# Patient Record
Sex: FEMALE | Race: WHITE | Employment: OTHER | URBAN - METROPOLITAN AREA
[De-identification: names, ages, dates, MRNs, and addresses within clinical notes are randomized per-mention and may not be internally consistent; named-entity substitution may affect disease eponyms.]

---

## 2018-03-09 PROBLEM — M85.80 OSTEOPENIA: Status: ACTIVE | Noted: 2018-03-09

## 2021-03-24 PROBLEM — N94.89 ADNEXAL MASS: Status: ACTIVE | Noted: 2021-03-24

## 2021-03-26 PROBLEM — H02.839 DERMATOCHALASIS OF EYELID: Status: ACTIVE | Noted: 2017-05-16

## 2021-03-26 PROBLEM — I10 HTN (HYPERTENSION): Status: ACTIVE | Noted: 2019-06-25

## 2021-03-26 PROBLEM — H02.36 BLEPHAROCHALASIS OF BOTH EYES: Status: ACTIVE | Noted: 2019-08-27

## 2021-03-26 PROBLEM — H02.33 BLEPHAROCHALASIS OF BOTH EYES: Status: ACTIVE | Noted: 2019-08-27

## 2021-03-26 PROBLEM — Z96.1 PSEUDOPHAKIA OF BOTH EYES: Status: ACTIVE | Noted: 2019-06-26

## 2021-03-30 ENCOUNTER — OFFICE VISIT (OUTPATIENT)
Dept: ONCOLOGY | Age: 81
End: 2021-03-30
Payer: MEDICARE

## 2021-03-30 VITALS
WEIGHT: 182 LBS | DIASTOLIC BLOOD PRESSURE: 67 MMHG | SYSTOLIC BLOOD PRESSURE: 151 MMHG | OXYGEN SATURATION: 99 % | BODY MASS INDEX: 35.73 KG/M2 | HEIGHT: 60 IN | TEMPERATURE: 97.1 F | HEART RATE: 68 BPM

## 2021-03-30 DIAGNOSIS — Z01.818 PREOPERATIVE TESTING: Primary | ICD-10-CM

## 2021-03-30 DIAGNOSIS — N83.8 OVARIAN MASS, LEFT: ICD-10-CM

## 2021-03-30 PROCEDURE — G8754 DIAS BP LESS 90: HCPCS | Performed by: OBSTETRICS & GYNECOLOGY

## 2021-03-30 PROCEDURE — G8399 PT W/DXA RESULTS DOCUMENT: HCPCS | Performed by: OBSTETRICS & GYNECOLOGY

## 2021-03-30 PROCEDURE — 1090F PRES/ABSN URINE INCON ASSESS: CPT | Performed by: OBSTETRICS & GYNECOLOGY

## 2021-03-30 PROCEDURE — G8417 CALC BMI ABV UP PARAM F/U: HCPCS | Performed by: OBSTETRICS & GYNECOLOGY

## 2021-03-30 PROCEDURE — 99205 OFFICE O/P NEW HI 60 MIN: CPT | Performed by: OBSTETRICS & GYNECOLOGY

## 2021-03-30 PROCEDURE — G8510 SCR DEP NEG, NO PLAN REQD: HCPCS | Performed by: OBSTETRICS & GYNECOLOGY

## 2021-03-30 PROCEDURE — G8427 DOCREV CUR MEDS BY ELIG CLIN: HCPCS | Performed by: OBSTETRICS & GYNECOLOGY

## 2021-03-30 PROCEDURE — 1101F PT FALLS ASSESS-DOCD LE1/YR: CPT | Performed by: OBSTETRICS & GYNECOLOGY

## 2021-03-30 PROCEDURE — G8753 SYS BP > OR = 140: HCPCS | Performed by: OBSTETRICS & GYNECOLOGY

## 2021-03-30 PROCEDURE — G8536 NO DOC ELDER MAL SCRN: HCPCS | Performed by: OBSTETRICS & GYNECOLOGY

## 2021-03-30 NOTE — PROGRESS NOTES
Qamar Amaro is a 80 y.o. female presents in office for left adnexal mass. Pt reports tenderness on LLQ. Pt denies any vag bleeding or problems w/ voiding and bm. Chief Complaint   Patient presents with    New Patient     lt adnexal mass, hx of cervical cancer           Visit Vitals  BP (!) 151/67 (BP 1 Location: Left upper arm, BP Patient Position: Sitting)   Pulse 68   Temp 97.1 °F (36.2 °C) (Oral)   Ht 5' (1.524 m)   Wt 182 lb (82.6 kg)   SpO2 99%   BMI 35.54 kg/m²         1. Have you been to the ER, urgent care clinic since your last visit? Hospitalized since your last visit? No    2. Have you seen or consulted any other health care providers outside of the [address] [place] [address] since your last visit? Include any pap smears or colon screening. Yes Dr. Raz Velasquez    3 most recent [facility] 36 Screens 3/30/2021   Little interest or pleasure in doing things Not at all   Feeling down, depressed, irritable, or hopeless Not at all   Total Score PHQ 2 0       No flowsheet data found. Fall Risk Assessment, last 12 mths 3/30/2021   Able to walk? Yes   Fall in past 12 months? 0   Do you feel unsteady? 0   Are you worried about falling 0       No flowsheet data found. \

## 2021-03-30 NOTE — PROGRESS NOTES
[facility]  [address], [address], [address] [facility]  [address] [facility], [address]  [place], [address]   [phone]  Chacha Barrow DO      Patient ID:  Name:  Kimberly Samayoa  MRN:  [id number]  :  1940/81 y.o. Date:  3/30/2021      HISTORY OF PRESENT ILLNESS:  Kimberly Samayoa is a 80 y.o.  postmenopausal female referred by Dr. Breonna Ascencio for Left adnexal mass. Pt had been having abdominal pain and had a CT scan by her primary revealed mass. Was seen by dr. Breonna Ascencio and had TVUS as below. Pt continues to have pain in LLQ that has gotten slightly worse. Denies bleeding.        H/o cervical cancer s/p radical hysterectomy, bso with positive nodes s/p EBRT to pelvic and para-aortic regions in   Labs:  : 23.8      Imaging  Scanned in media  TVUS 3/24/2021  Uterus surgically absent  Left adnexa: 6.9 x 3.3 x 3.7 cm    CT scanned in media  6.2 x 3.6 x 8.0 cm lobulated cystic lesion in the adnexa on left  ROS:   As above      Patient Active Problem List    Diagnosis Date Noted    Adnexal mass 2021    Blepharochalasis of both eyes 2019    Pseudophakia of both eyes 2019    HTN (hypertension) 2019    Meniere's disease     Hypercholesterolemia     Thyroid disease     Osteopenia 2018    Dermatochalasis of eyelid 2017    Malignant neoplasm ([address] 1970     Past Medical History:   Diagnosis Date    Basal cell adenocarcinoma     Cancer ([address]     cervical    Hypercholesterolemia     Hypertension     Meniere's disease     Osteopenia     Radiation therapy complication      (spontaneous vaginal delivery) 1964    Thyroid cancer ([address]     Thyroid disease       Past Surgical History:   Procedure Laterality Date    HX CATARACT REMOVAL Left 2019    HX CHOLECYSTECTOMY  2018    HX GYN  1971    rad hyst, xrt    HX ORTHOPAEDIC  1998    R carpal Tunnel    HX PARTIAL THYROIDECTOMY Left 1986    HX SKIN BIOPSY  2018    basal cell removed    HX TONSIL AND ADENOIDECTOMY  1948      OB History        1    Para   1    Term                AB        Living   1       SAB        TAB        Ectopic        Molar        Multiple        Live Births   1              Social History     Tobacco Use    Smoking status: Former Smoker     Quit date:      Years since quittin.4    Smokeless tobacco: Never Used   Substance Use Topics    Alcohol use: No      Family History   Problem Relation Age of Onset    Cancer Mother         cervix    Thyroid Disease Mother     Heart Disease Father     Migraines Sister     Thyroid Disease Sister       Current Outpatient Medications   Medication Sig    dilTIAZem ER (DILACOR XR) 120 mg capsule Take  by mouth daily.  rizatriptan (Maxalt-MLT) 5 mg rapid dissolve tablet Take  by mouth.  cholecalciferol (Vitamin D3) (1000 Units /25 mcg) tablet Take  by mouth daily.  vitamin e (E GEMS) 100 unit capsule Take  by mouth daily.  Premarin 0.625 mg tablet TAKE 1 TABLET DAILY    levothyroxine (SYNTHROID) 137 mcg tablet      No current facility-administered medications for this visit. Allergies   Allergen Reactions    Nortriptyline Other (comments)    Codeine Nausea and Vomiting          OBJECTIVE:    Physical Exam  VITAL SIGNS: Visit Vitals  BP (!) 151/67 (BP 1 Location: Left upper arm, BP Patient Position: Sitting)   Pulse 68   Temp 97.1 °F (36.2 °C) (Oral)   Ht 5' (1.524 m)   Wt 82.6 kg (182 lb)   SpO2 99%   BMI 35.54 kg/m²      GENERAL ALAINA: in no apparent distress and well developed and well nourished   MUSCULOSKEL: no joint tenderness, deformity or swelling   INTEGUMENT:  warm and dry, no rashes or lesions   ABDOMEN . soft, NT, ND, No masses appreciated   EXTREMITIES: extremities normal, atraumatic, no cyanosis or edema   PELVIC: External genitalia: normal general appearance  Vaginal: atrophic mucosa  Cervix: removed surgically  Adnexa: non palpable due to vaginal scarring  Uterus: removed surgically   RECTAL: deferred   SUSANNA SURVEY: Cervical, supraclavicular, axillary and inguinal nodes normal.   NEURO: Grossly normal         IMPRESSION/PLAN:  1. Left adnexal mass, pelvic pain   --reviewed her imaging and bloodwork and explained likely benign, however, would recommend surgical evaluation especially since having pain   -discussed plan for resection of mass, bso with intraop path and possible staging although explained would likely defer lymph node dissection given prior XRT   -discussed robotic approach   Will need pcp clearance   -surgery to be scheduled at [facility]   -Risks, benefits and alternatives of surgery discussed in detail. Risks including bleeding, infection, blood clot, injury to nearby organs including bladder, bowel, ureters and blood vessels.            The total time spent was 60 minutes regarding this patients diagnosis of pelvic mass and >50% of this time was spent counseling and coordinating care    [facility]  3/30/39540:12 PM

## 2021-03-30 NOTE — LETTER
3/30/2021 Patient: Haritha Oh YOB: 1940 Date of Visit: 3/30/2021 Beverly Peters, [address] [place] [id number] Via Fax: [phone] Dear Beverly Peters MD, Thank you for referring Ms. Haritha Oh to [facility] for evaluation. My notes for this consultation are attached. If you have questions, please do not hesitate to call me. I look forward to following your patient along with you. Sincerely, Rossy Prajapati MD

## 2021-03-30 NOTE — H&P (VIEW-ONLY)
[address], [address] [place], [address] [place] 
[address] [place] [address], [address] [facility] 
 [phone] Tracie Weinberg DO Patient ID: 
Name:  Tina Macias MRN:  [id number] :  1940/81 y.o. Date:  3/30/2021 HISTORY OF PRESENT ILLNESS: 
Tina Macias is a 80 y.o.  postmenopausal female referred by Dr. Janet Sen for Left adnexal mass. Pt had been having abdominal pain and had a CT scan by her primary revealed mass. Was seen by dr. Janet Sen and had TVUS as below. Pt continues to have pain in LLQ that has gotten slightly worse. Denies bleeding. H/o cervical cancer s/p radical hysterectomy, bso with positive nodes s/p EBRT to pelvic and para-aortic regions in  Labs: 
: 23.8 Imaging Scanned in media TVUS 3/24/2021 Uterus surgically absent Left adnexa: 6.9 x 3.3 x 3.7 cm 
 
CT scanned in media 6.2 x 3.6 x 8.0 cm lobulated cystic lesion in the adnexa on left ROS:  
As above Patient Active Problem List  
 Diagnosis Date Noted  Adnexal mass 2021  Blepharochalasis of both eyes 2019  Pseudophakia of both eyes 2019  
 HTN (hypertension) 2019  Meniere's disease  Hypercholesterolemia  Thyroid disease  Osteopenia 2018  Dermatochalasis of eyelid 2017  Malignant neoplasm ([address] 1970 Past Medical History:  
Diagnosis Date  Basal cell adenocarcinoma  Cancer ([address] cervical  
 Hypercholesterolemia  Hypertension  Meniere's disease  Osteopenia  Radiation therapy complication   (spontaneous vaginal delivery) 1964  Thyroid cancer ([address]  Thyroid disease Past Surgical History:  
Procedure Laterality Date  HX CATARACT REMOVAL Left 2019  HX CHOLECYSTECTOMY  2018  HX GYN  1971  
 rad hyst, xrt [address] [place] R carpal Tunnel  HX PARTIAL THYROIDECTOMY Left   HX SKIN BIOPSY  2018  
 basal cell removed [address] [facility] OB History Denisse Rivera 1 Para 1 Term  AB Living  
1 SAB  
   
 TAB Ectopic Molar Multiple Live Births 1 Social History Tobacco Use  Smoking status: Former Smoker Quit date: 1970 Years since quittin.2  Smokeless tobacco: Never Used Substance Use Topics  Alcohol use: No  
  
Family History Problem Relation Age of Onset  Cancer Mother   
     cervix  Thyroid Disease Mother  Heart Disease Father  Migraines Sister  Thyroid Disease Sister Current Outpatient Medications Medication Sig  
 dilTIAZem ER (DILACOR XR) 120 mg capsule Take  by mouth daily.  rizatriptan (Maxalt-MLT) 5 mg rapid dissolve tablet Take  by mouth.  cholecalciferol (Vitamin D3) (1000 Units /25 mcg) tablet Take  by mouth daily.  vitamin e (E GEMS) 100 unit capsule Take  by mouth daily.  Premarin 0.625 mg tablet TAKE 1 TABLET DAILY  levothyroxine (SYNTHROID) 137 mcg tablet No current facility-administered medications for this visit. Allergies Allergen Reactions  Nortriptyline Other (comments)  Codeine Nausea and Vomiting OBJECTIVE: 
 
Physical Exam 
VITAL SIGNS: Visit Vitals BP (!) 151/67 (BP 1 Location: Left upper arm, BP Patient Position: Sitting) Pulse 68 Temp 97.1 °F (36.2 °C) (Oral) Ht 5' (1.524 m) Wt 82.6 kg (182 lb) SpO2 99% BMI 35.54 kg/m² GENERAL ALAINA: in no apparent distress and well developed and well nourished MUSCULOSKEL: no joint tenderness, deformity or swelling INTEGUMENT:  warm and dry, no rashes or lesions ABDOMEN . soft, NT, ND, No masses appreciated EXTREMITIES: extremities normal, atraumatic, no cyanosis or edema PELVIC: External genitalia: normal general appearance Vaginal: atrophic mucosa Cervix: removed surgically Adnexa: non palpable due to vaginal scarring Uterus: removed surgically RECTAL: deferred SUSANNA SURVEY: Cervical, supraclavicular, axillary and inguinal nodes normal.  
NEURO: Grossly normal  
 
 
 
IMPRESSION/PLAN: 
1. Left adnexal mass, pelvic pain 
 --reviewed her imaging and bloodwork and explained likely benign, however, would recommend surgical evaluation especially since having pain 
 -discussed plan for resection of mass, bso with intraop path and possible staging although explained would likely defer lymph node dissection given prior XRT 
 -discussed robotic approach Will need pcp clearance 
 -surgery to be scheduled at [facility] 
 -Risks, benefits and alternatives of surgery discussed in detail. Risks including bleeding, infection, blood clot, injury to nearby organs including bladder, bowel, ureters and blood vessels. The total time spent was 60 minutes regarding this patients diagnosis of pelvic mass and >50% of this time was spent counseling and coordinating care Lisa Singh DO Gynecologic Oncology 3/30/86741:12 PM

## 2021-04-05 ENCOUNTER — TELEPHONE (OUTPATIENT)
Dept: ONCOLOGY | Age: 81
End: 2021-04-05

## 2021-04-05 NOTE — TELEPHONE ENCOUNTER
Patient would like a call back regarding medical equipment after surgery. Patient wanted to know prior to surgery if this request could be started.

## 2021-04-06 DIAGNOSIS — Z01.818 PREOPERATIVE TESTING: ICD-10-CM

## 2021-04-06 NOTE — TELEPHONE ENCOUNTER
Spoke with patient who inquired if she would need a walker or equipment to help with getting off the toilet. Relayed that the equipment is not normally needed after laparoscopic procedures.

## 2021-04-08 ENCOUNTER — HOSPITAL ENCOUNTER (OUTPATIENT)
Dept: PREADMISSION TESTING | Age: 81
Discharge: HOME | End: 2021-04-08
Payer: MEDICARE

## 2021-04-08 ENCOUNTER — TRANSCRIBE ORDER (OUTPATIENT)
Dept: REGISTRATION | Age: 81
End: 2021-04-08

## 2021-04-08 ENCOUNTER — OFFICE VISIT (OUTPATIENT)
Dept: ONCOLOGY | Age: 81
End: 2021-04-08

## 2021-04-08 VITALS
SYSTOLIC BLOOD PRESSURE: 186 MMHG | HEIGHT: 60 IN | DIASTOLIC BLOOD PRESSURE: 92 MMHG | BODY MASS INDEX: 34.16 KG/M2 | HEART RATE: 72 BPM | RESPIRATION RATE: 16 BRPM | WEIGHT: 174 LBS

## 2021-04-08 DIAGNOSIS — N83.8 BROAD LIGAMENT LACERATION SYNDROME: Primary | ICD-10-CM

## 2021-04-08 DIAGNOSIS — Z01.818 PREOPERATIVE TESTING: ICD-10-CM

## 2021-04-08 DIAGNOSIS — N83.8 BROAD LIGAMENT LACERATION SYNDROME: ICD-10-CM

## 2021-04-08 DIAGNOSIS — Z71.89 SURGICAL COUNSELING VISIT: Primary | ICD-10-CM

## 2021-04-08 LAB
ABO + RH BLD: NORMAL
ALBUMIN SERPL-MCNC: 4 G/DL (ref 3.4–5)
ALBUMIN/GLOB SERPL: 1.1 {RATIO} (ref 0.8–1.7)
ALP SERPL-CCNC: 78 U/L (ref 45–117)
ALT SERPL-CCNC: 24 U/L (ref 13–56)
ANION GAP SERPL CALC-SCNC: 6 MMOL/L (ref 3–18)
AST SERPL-CCNC: 19 U/L (ref 10–38)
ATRIAL RATE: 68 BPM
BASOPHILS # BLD: 0 K/UL (ref 0–0.1)
BASOPHILS NFR BLD: 1 % (ref 0–2)
BILIRUB SERPL-MCNC: 1 MG/DL (ref 0.2–1)
BLOOD GROUP ANTIBODIES SERPL: NORMAL
BUN SERPL-MCNC: 17 MG/DL (ref 7–18)
BUN/CREAT SERPL: 18 (ref 12–20)
CALCIUM SERPL-MCNC: 9.7 MG/DL (ref 8.5–10.1)
CALCULATED P AXIS, ECG09: 68 DEGREES
CALCULATED R AXIS, ECG10: 72 DEGREES
CALCULATED T AXIS, ECG11: 54 DEGREES
CHLORIDE SERPL-SCNC: 100 MMOL/L (ref 100–111)
CO2 SERPL-SCNC: 33 MMOL/L (ref 21–32)
CREAT SERPL-MCNC: 0.93 MG/DL (ref 0.6–1.3)
DIAGNOSIS, 93000: NORMAL
DIFFERENTIAL METHOD BLD: ABNORMAL
EOSINOPHIL # BLD: 0.1 K/UL (ref 0–0.4)
EOSINOPHIL NFR BLD: 2 % (ref 0–5)
ERYTHROCYTE [DISTWIDTH] IN BLOOD BY AUTOMATED COUNT: 13.7 % (ref 11.6–14.5)
GLOBULIN SER CALC-MCNC: 3.5 G/DL (ref 2–4)
GLUCOSE SERPL-MCNC: 96 MG/DL (ref 74–99)
HCT VFR BLD AUTO: 47.8 % (ref 35–45)
HGB BLD-MCNC: 15.6 G/DL (ref 12–16)
LYMPHOCYTES # BLD: 1.5 K/UL (ref 0.9–3.6)
LYMPHOCYTES NFR BLD: 21 % (ref 21–52)
MCH RBC QN AUTO: 30.1 PG (ref 24–34)
MCHC RBC AUTO-ENTMCNC: 32.6 G/DL (ref 31–37)
MCV RBC AUTO: 92.3 FL (ref 74–97)
MONOCYTES # BLD: 0.6 K/UL (ref 0.05–1.2)
MONOCYTES NFR BLD: 8 % (ref 3–10)
NEUTS SEG # BLD: 5 K/UL (ref 1.8–8)
NEUTS SEG NFR BLD: 69 % (ref 40–73)
P-R INTERVAL, ECG05: 188 MS
PLATELET # BLD AUTO: 254 K/UL (ref 135–420)
PMV BLD AUTO: 9.9 FL (ref 9.2–11.8)
POTASSIUM SERPL-SCNC: 3.6 MMOL/L (ref 3.5–5.5)
PROT SERPL-MCNC: 7.5 G/DL (ref 6.4–8.2)
Q-T INTERVAL, ECG07: 392 MS
QRS DURATION, ECG06: 80 MS
QTC CALCULATION (BEZET), ECG08: 416 MS
RBC # BLD AUTO: 5.18 M/UL (ref 4.2–5.3)
SODIUM SERPL-SCNC: 139 MMOL/L (ref 136–145)
SPECIMEN EXP DATE BLD: NORMAL
VENTRICULAR RATE, ECG03: 68 BPM
WBC # BLD AUTO: 7.3 K/UL (ref 4.6–13.2)

## 2021-04-08 PROCEDURE — 86901 BLOOD TYPING SEROLOGIC RH(D): CPT

## 2021-04-08 PROCEDURE — 93005 ELECTROCARDIOGRAM TRACING: CPT

## 2021-04-08 PROCEDURE — 85025 COMPLETE CBC W/AUTO DIFF WBC: CPT

## 2021-04-08 PROCEDURE — U0003 INFECTIOUS AGENT DETECTION BY NUCLEIC ACID (DNA OR RNA); SEVERE ACUTE RESPIRATORY SYNDROME CORONAVIRUS 2 (SARS-COV-2) (CORONAVIRUS DISEASE [COVID-19]), AMPLIFIED PROBE TECHNIQUE, MAKING USE OF HIGH THROUGHPUT TECHNOLOGIES AS DESCRIBED BY CMS-2020-01-R: HCPCS

## 2021-04-08 PROCEDURE — 80053 COMPREHEN METABOLIC PANEL: CPT

## 2021-04-08 PROCEDURE — 36415 COLL VENOUS BLD VENIPUNCTURE: CPT

## 2021-04-08 NOTE — PROGRESS NOTES
Reviewed consent form for robotic assisted laparoscopic resection of pelvic mass, bilateral salpingo-oophorectomy, possible staging and information packet for a bilateral salpingo-oophorectomy scheduled on 4/15/21 at 11 am with an arrival time of 9 am. Patient was given information packet that included pre-op and post-op instructions as well as the scheduled date, start time, arrival time, and length of the surgery and signed the consent form. Patient expressed understanding and had no further questions. Patient was encouraged to call if they have questions later. The patient was counseled at length about the risks of becky Covid-19 during their perioperative period and any recovery window from their procedure. The patient was made aware that becky Covid-19  may worsen their prognosis for recovering from their procedure and lend to a higher morbidity and/or mortality risk. All material risks, benefits, and reasonable alternatives including postponing the procedure were discussed. The patient does wish to proceed with the procedure at this time.

## 2021-04-09 ENCOUNTER — TELEPHONE (OUTPATIENT)
Dept: ONCOLOGY | Age: 81
End: 2021-04-09

## 2021-04-09 LAB — SARS-COV-2, COV2NT: NOT DETECTED

## 2021-04-09 NOTE — TELEPHONE ENCOUNTER
Pt called wanting to inform you that her BP has been high lately. Pt did speak to her PCP this morning. Surgery is scheduled for 4/15/21. Pt was instructed to start taking her Triamterene 1 tab in the am and 2 tabs of the Amolidipine in the pm. Pt to collect her BP readings over the weekend. Pt will f/u her PCP and will get back to us after.

## 2021-04-13 NOTE — TELEPHONE ENCOUNTER
Spoke with patient regarding blood pressure with change of medication. Pt reports blood pressure is improved.

## 2021-04-14 ENCOUNTER — TELEPHONE (OUTPATIENT)
Dept: ONCOLOGY | Age: 81
End: 2021-04-14

## 2021-04-15 ENCOUNTER — HOSPITAL ENCOUNTER (OUTPATIENT)
Age: 81
Setting detail: OUTPATIENT SURGERY
Discharge: HOME | End: 2021-04-15
Attending: OBSTETRICS & GYNECOLOGY | Admitting: OBSTETRICS & GYNECOLOGY
Payer: MEDICARE

## 2021-04-15 ENCOUNTER — ANESTHESIA EVENT (OUTPATIENT)
Dept: SURGERY | Age: 81
End: 2021-04-15
Payer: MEDICARE

## 2021-04-15 ENCOUNTER — TELEPHONE (OUTPATIENT)
Dept: ONCOLOGY | Age: 81
End: 2021-04-15

## 2021-04-15 ENCOUNTER — ANESTHESIA (OUTPATIENT)
Dept: SURGERY | Age: 81
End: 2021-04-15
Payer: MEDICARE

## 2021-04-15 VITALS
WEIGHT: 174.44 LBS | TEMPERATURE: 98.1 F | DIASTOLIC BLOOD PRESSURE: 67 MMHG | BODY MASS INDEX: 34.25 KG/M2 | OXYGEN SATURATION: 96 % | HEART RATE: 80 BPM | HEIGHT: 60 IN | RESPIRATION RATE: 12 BRPM | SYSTOLIC BLOOD PRESSURE: 123 MMHG

## 2021-04-15 DIAGNOSIS — N83.202 LEFT OVARIAN CYST: ICD-10-CM

## 2021-04-15 DIAGNOSIS — G89.18 POSTOPERATIVE PAIN: Primary | ICD-10-CM

## 2021-04-15 PROCEDURE — 58662 LAPAROSCOPY EXCISE LESIONS: CPT | Performed by: OBSTETRICS & GYNECOLOGY

## 2021-04-15 PROCEDURE — 77030008574 HC TBNG SUC IRR STRY -B: Performed by: OBSTETRICS & GYNECOLOGY

## 2021-04-15 PROCEDURE — 77030028402 HC SYS LAPSC TISS RETRV AMR -B: Performed by: OBSTETRICS & GYNECOLOGY

## 2021-04-15 PROCEDURE — 77030040830 HC CATH URETH FOL MDII -A: Performed by: OBSTETRICS & GYNECOLOGY

## 2021-04-15 PROCEDURE — 74011250636 HC RX REV CODE- 250/636: Performed by: ANESTHESIOLOGY

## 2021-04-15 PROCEDURE — C9290 INJ, BUPIVACAINE LIPOSOME: HCPCS | Performed by: OBSTETRICS & GYNECOLOGY

## 2021-04-15 PROCEDURE — 74011000250 HC RX REV CODE- 250: Performed by: ANESTHESIOLOGY

## 2021-04-15 PROCEDURE — 77030032060 HC PWDR HEMSTAT ARISTA ASRB 3GM BARD -C: Performed by: OBSTETRICS & GYNECOLOGY

## 2021-04-15 PROCEDURE — 77030020703 HC SEAL CANN DISP INTU -B: Performed by: OBSTETRICS & GYNECOLOGY

## 2021-04-15 PROCEDURE — 77030040361 HC SLV COMPR DVT MDII -B: Performed by: OBSTETRICS & GYNECOLOGY

## 2021-04-15 PROCEDURE — 74011250636 HC RX REV CODE- 250/636: Performed by: OBSTETRICS & GYNECOLOGY

## 2021-04-15 PROCEDURE — 76060000033 HC ANESTHESIA 1 TO 1.5 HR: Performed by: OBSTETRICS & GYNECOLOGY

## 2021-04-15 PROCEDURE — 58661 LAPAROSCOPY REMOVE ADNEXA: CPT | Performed by: OBSTETRICS & GYNECOLOGY

## 2021-04-15 PROCEDURE — 77030013079 HC BLNKT BAIR HGGR 3M -A: Performed by: ANESTHESIOLOGY

## 2021-04-15 PROCEDURE — 77030019927 HC TBNG IRR CYSTO BAXT -A: Performed by: OBSTETRICS & GYNECOLOGY

## 2021-04-15 PROCEDURE — 74011000250 HC RX REV CODE- 250: Performed by: OBSTETRICS & GYNECOLOGY

## 2021-04-15 PROCEDURE — 76210000000 HC OR PH I REC 2 TO 2.5 HR: Performed by: OBSTETRICS & GYNECOLOGY

## 2021-04-15 PROCEDURE — 77030025805 HC MANIP UTER RUMI COOP -B: Performed by: OBSTETRICS & GYNECOLOGY

## 2021-04-15 PROCEDURE — 77030020782 HC GWN BAIR PAWS FLX 3M -B: Performed by: OBSTETRICS & GYNECOLOGY

## 2021-04-15 PROCEDURE — 77030027743 HC APPL F/HEMSTAT BARD -B: Performed by: OBSTETRICS & GYNECOLOGY

## 2021-04-15 PROCEDURE — 77030008683 HC TU ET CUF COVD -A: Performed by: ANESTHESIOLOGY

## 2021-04-15 PROCEDURE — 74011250637 HC RX REV CODE- 250/637: Performed by: ANESTHESIOLOGY

## 2021-04-15 PROCEDURE — 77030016151 HC PROTCTR LNS DFOG COVD -B: Performed by: OBSTETRICS & GYNECOLOGY

## 2021-04-15 PROCEDURE — 77030002933 HC SUT MCRYL J&J -A: Performed by: OBSTETRICS & GYNECOLOGY

## 2021-04-15 PROCEDURE — 77030035277 HC OBTRTR BLDELSS DISP INTU -B: Performed by: OBSTETRICS & GYNECOLOGY

## 2021-04-15 PROCEDURE — 88112 CYTOPATH CELL ENHANCE TECH: CPT

## 2021-04-15 PROCEDURE — 2709999900 HC NON-CHARGEABLE SUPPLY: Performed by: OBSTETRICS & GYNECOLOGY

## 2021-04-15 PROCEDURE — 77030006643: Performed by: ANESTHESIOLOGY

## 2021-04-15 PROCEDURE — 88331 PATH CONSLTJ SURG 1 BLK 1SPC: CPT

## 2021-04-15 PROCEDURE — 77030020407 HC IV BLD WRMR ST 3M -A: Performed by: ANESTHESIOLOGY

## 2021-04-15 PROCEDURE — 77030002966 HC SUT PDS J&J -A: Performed by: OBSTETRICS & GYNECOLOGY

## 2021-04-15 PROCEDURE — 76010000934 HC OR TIME 1 TO 1.5HR INTENSV - TIER 2: Performed by: OBSTETRICS & GYNECOLOGY

## 2021-04-15 PROCEDURE — 74011000254 HC RX REV CODE- 254: Performed by: ANESTHESIOLOGY

## 2021-04-15 PROCEDURE — 74011000254 HC RX REV CODE- 254: Performed by: OBSTETRICS & GYNECOLOGY

## 2021-04-15 PROCEDURE — 88305 TISSUE EXAM BY PATHOLOGIST: CPT

## 2021-04-15 PROCEDURE — 76210000022 HC REC RM PH II 1.5 TO 2 HR: Performed by: OBSTETRICS & GYNECOLOGY

## 2021-04-15 PROCEDURE — 77030037241 HC PRT ACC BLDLSS AIRSEAL CNMD -B: Performed by: OBSTETRICS & GYNECOLOGY

## 2021-04-15 RX ORDER — ROCURONIUM BROMIDE 10 MG/ML
INJECTION, SOLUTION INTRAVENOUS AS NEEDED
Status: DISCONTINUED | OUTPATIENT
Start: 2021-04-15 | End: 2021-04-15 | Stop reason: HOSPADM

## 2021-04-15 RX ORDER — SODIUM CHLORIDE, SODIUM LACTATE, POTASSIUM CHLORIDE, CALCIUM CHLORIDE 600; 310; 30; 20 MG/100ML; MG/100ML; MG/100ML; MG/100ML
75 INJECTION, SOLUTION INTRAVENOUS CONTINUOUS
Status: DISCONTINUED | OUTPATIENT
Start: 2021-04-15 | End: 2021-04-15 | Stop reason: HOSPADM

## 2021-04-15 RX ORDER — FENTANYL CITRATE 50 UG/ML
INJECTION, SOLUTION INTRAMUSCULAR; INTRAVENOUS AS NEEDED
Status: DISCONTINUED | OUTPATIENT
Start: 2021-04-15 | End: 2021-04-15

## 2021-04-15 RX ORDER — HEPARIN SODIUM 5000 [USP'U]/ML
5000 INJECTION, SOLUTION INTRAVENOUS; SUBCUTANEOUS ONCE
Status: COMPLETED | OUTPATIENT
Start: 2021-04-15 | End: 2021-04-15

## 2021-04-15 RX ORDER — ACETAMINOPHEN 500 MG
1000 TABLET ORAL ONCE
Status: COMPLETED | OUTPATIENT
Start: 2021-04-15 | End: 2021-04-15

## 2021-04-15 RX ORDER — PROPOFOL 10 MG/ML
INJECTION, EMULSION INTRAVENOUS AS NEEDED
Status: DISCONTINUED | OUTPATIENT
Start: 2021-04-15 | End: 2021-04-15 | Stop reason: HOSPADM

## 2021-04-15 RX ORDER — ONDANSETRON 2 MG/ML
INJECTION INTRAMUSCULAR; INTRAVENOUS AS NEEDED
Status: DISCONTINUED | OUTPATIENT
Start: 2021-04-15 | End: 2021-04-15 | Stop reason: HOSPADM

## 2021-04-15 RX ORDER — SODIUM CHLORIDE, SODIUM LACTATE, POTASSIUM CHLORIDE, CALCIUM CHLORIDE 600; 310; 30; 20 MG/100ML; MG/100ML; MG/100ML; MG/100ML
125 INJECTION, SOLUTION INTRAVENOUS CONTINUOUS
Status: DISCONTINUED | OUTPATIENT
Start: 2021-04-15 | End: 2021-04-15 | Stop reason: HOSPADM

## 2021-04-15 RX ORDER — ONDANSETRON 4 MG/1
4 TABLET, FILM COATED ORAL
Qty: 30 TAB | Refills: 0 | Status: SHIPPED | OUTPATIENT
Start: 2021-04-15

## 2021-04-15 RX ORDER — ONDANSETRON 2 MG/ML
4 INJECTION INTRAMUSCULAR; INTRAVENOUS ONCE
Status: COMPLETED | OUTPATIENT
Start: 2021-04-15 | End: 2021-04-15

## 2021-04-15 RX ORDER — LABETALOL HCL 20 MG/4 ML
SYRINGE (ML) INTRAVENOUS AS NEEDED
Status: DISCONTINUED | OUTPATIENT
Start: 2021-04-15 | End: 2021-04-15 | Stop reason: HOSPADM

## 2021-04-15 RX ORDER — OXYCODONE AND ACETAMINOPHEN 5; 325 MG/1; MG/1
1 TABLET ORAL
Qty: 40 TAB | Refills: 0 | Status: SHIPPED | OUTPATIENT
Start: 2021-04-15 | End: 2021-04-29

## 2021-04-15 RX ORDER — SUCCINYLCHOLINE CHLORIDE 100 MG/5ML
SYRINGE (ML) INTRAVENOUS AS NEEDED
Status: DISCONTINUED | OUTPATIENT
Start: 2021-04-15 | End: 2021-04-15 | Stop reason: HOSPADM

## 2021-04-15 RX ORDER — LIDOCAINE HYDROCHLORIDE 20 MG/ML
INJECTION, SOLUTION EPIDURAL; INFILTRATION; INTRACAUDAL; PERINEURAL AS NEEDED
Status: DISCONTINUED | OUTPATIENT
Start: 2021-04-15 | End: 2021-04-15 | Stop reason: HOSPADM

## 2021-04-15 RX ORDER — CEFAZOLIN SODIUM/WATER 2 G/20 ML
2 SYRINGE (ML) INTRAVENOUS ONCE
Status: COMPLETED | OUTPATIENT
Start: 2021-04-15 | End: 2021-04-15

## 2021-04-15 RX ORDER — MIDAZOLAM HYDROCHLORIDE 1 MG/ML
INJECTION, SOLUTION INTRAMUSCULAR; INTRAVENOUS AS NEEDED
Status: DISCONTINUED | OUTPATIENT
Start: 2021-04-15 | End: 2021-04-15 | Stop reason: HOSPADM

## 2021-04-15 RX ORDER — SODIUM CHLORIDE, SODIUM LACTATE, POTASSIUM CHLORIDE, CALCIUM CHLORIDE 600; 310; 30; 20 MG/100ML; MG/100ML; MG/100ML; MG/100ML
25 INJECTION, SOLUTION INTRAVENOUS CONTINUOUS
Status: DISCONTINUED | OUTPATIENT
Start: 2021-04-15 | End: 2021-04-15 | Stop reason: HOSPADM

## 2021-04-15 RX ORDER — FENTANYL CITRATE 50 UG/ML
25 INJECTION, SOLUTION INTRAMUSCULAR; INTRAVENOUS AS NEEDED
Status: DISCONTINUED | OUTPATIENT
Start: 2021-04-15 | End: 2021-04-15 | Stop reason: HOSPADM

## 2021-04-15 RX ORDER — EPHEDRINE SULFATE/0.9% NACL/PF 50 MG/5 ML
SYRINGE (ML) INTRAVENOUS AS NEEDED
Status: DISCONTINUED | OUTPATIENT
Start: 2021-04-15 | End: 2021-04-15 | Stop reason: HOSPADM

## 2021-04-15 RX ORDER — HYDROMORPHONE HYDROCHLORIDE 1 MG/ML
0.2 INJECTION, SOLUTION INTRAMUSCULAR; INTRAVENOUS; SUBCUTANEOUS
Status: DISCONTINUED | OUTPATIENT
Start: 2021-04-15 | End: 2021-04-15 | Stop reason: HOSPADM

## 2021-04-15 RX ORDER — HYDRALAZINE HYDROCHLORIDE 20 MG/ML
INJECTION INTRAMUSCULAR; INTRAVENOUS AS NEEDED
Status: DISCONTINUED | OUTPATIENT
Start: 2021-04-15 | End: 2021-04-15 | Stop reason: HOSPADM

## 2021-04-15 RX ADMIN — FENTANYL CITRATE 50 MCG: 50 INJECTION, SOLUTION INTRAMUSCULAR; INTRAVENOUS at 11:48

## 2021-04-15 RX ADMIN — SUGAMMADEX 158 MG: 100 INJECTION, SOLUTION INTRAVENOUS at 12:56

## 2021-04-15 RX ADMIN — PROPOFOL 120 MG: 10 INJECTION, EMULSION INTRAVENOUS at 11:48

## 2021-04-15 RX ADMIN — ROCURONIUM BROMIDE 10 MG: 10 INJECTION, SOLUTION INTRAVENOUS at 11:48

## 2021-04-15 RX ADMIN — FENTANYL CITRATE 25 MCG: 50 INJECTION, SOLUTION INTRAMUSCULAR; INTRAVENOUS at 14:02

## 2021-04-15 RX ADMIN — MIDAZOLAM 2 MG: 1 INJECTION INTRAMUSCULAR; INTRAVENOUS at 11:42

## 2021-04-15 RX ADMIN — LABETALOL 20 MG/4 ML (5 MG/ML) INTRAVENOUS SYRINGE 5 MG: at 12:09

## 2021-04-15 RX ADMIN — CEFAZOLIN 2 G: 1 INJECTION, POWDER, FOR SOLUTION INTRAVENOUS at 11:54

## 2021-04-15 RX ADMIN — FENTANYL CITRATE 50 MCG: 50 INJECTION, SOLUTION INTRAMUSCULAR; INTRAVENOUS at 12:07

## 2021-04-15 RX ADMIN — SODIUM CHLORIDE, SODIUM LACTATE, POTASSIUM CHLORIDE, AND CALCIUM CHLORIDE: 600; 310; 30; 20 INJECTION, SOLUTION INTRAVENOUS at 12:35

## 2021-04-15 RX ADMIN — SODIUM CHLORIDE, SODIUM LACTATE, POTASSIUM CHLORIDE, AND CALCIUM CHLORIDE: 600; 310; 30; 20 INJECTION, SOLUTION INTRAVENOUS at 11:42

## 2021-04-15 RX ADMIN — FENTANYL CITRATE 25 MCG: 50 INJECTION, SOLUTION INTRAMUSCULAR; INTRAVENOUS at 13:52

## 2021-04-15 RX ADMIN — SODIUM CHLORIDE, SODIUM LACTATE, POTASSIUM CHLORIDE, AND CALCIUM CHLORIDE 125 ML/HR: 600; 310; 30; 20 INJECTION, SOLUTION INTRAVENOUS at 09:35

## 2021-04-15 RX ADMIN — INDIGO CARMINE 40 MG: 8 INJECTION, SOLUTION INTRAMUSCULAR; INTRAVENOUS at 12:34

## 2021-04-15 RX ADMIN — SODIUM CHLORIDE, SODIUM LACTATE, POTASSIUM CHLORIDE, AND CALCIUM CHLORIDE 75 ML/HR: 600; 310; 30; 20 INJECTION, SOLUTION INTRAVENOUS at 15:20

## 2021-04-15 RX ADMIN — LABETALOL 20 MG/4 ML (5 MG/ML) INTRAVENOUS SYRINGE 5 MG: at 12:16

## 2021-04-15 RX ADMIN — ONDANSETRON HYDROCHLORIDE 4 MG: 2 INJECTION INTRAMUSCULAR; INTRAVENOUS at 12:27

## 2021-04-15 RX ADMIN — FENTANYL CITRATE 50 MCG: 50 INJECTION, SOLUTION INTRAMUSCULAR; INTRAVENOUS at 12:26

## 2021-04-15 RX ADMIN — ACETAMINOPHEN 1000 MG: 500 TABLET ORAL at 09:29

## 2021-04-15 RX ADMIN — FENTANYL CITRATE 25 MCG: 50 INJECTION, SOLUTION INTRAMUSCULAR; INTRAVENOUS at 14:12

## 2021-04-15 RX ADMIN — Medication 100 MG: at 11:48

## 2021-04-15 RX ADMIN — SODIUM CHLORIDE, SODIUM LACTATE, POTASSIUM CHLORIDE, AND CALCIUM CHLORIDE 75 ML/HR: 600; 310; 30; 20 INJECTION, SOLUTION INTRAVENOUS at 09:30

## 2021-04-15 RX ADMIN — HYDRALAZINE HYDROCHLORIDE 5 MG: 20 INJECTION, SOLUTION INTRAMUSCULAR; INTRAVENOUS at 12:36

## 2021-04-15 RX ADMIN — HEPARIN SODIUM 5000 UNITS: 5000 INJECTION INTRAVENOUS; SUBCUTANEOUS at 09:30

## 2021-04-15 RX ADMIN — ROCURONIUM BROMIDE 20 MG: 10 INJECTION, SOLUTION INTRAVENOUS at 11:54

## 2021-04-15 RX ADMIN — ONDANSETRON 4 MG: 2 INJECTION INTRAMUSCULAR; INTRAVENOUS at 13:48

## 2021-04-15 RX ADMIN — Medication 10 MG: at 12:01

## 2021-04-15 RX ADMIN — LIDOCAINE HYDROCHLORIDE 60 MG: 20 INJECTION, SOLUTION EPIDURAL; INFILTRATION; INTRACAUDAL; PERINEURAL at 11:48

## 2021-04-15 RX ADMIN — PROMETHAZINE HYDROCHLORIDE 12.5 MG: 25 INJECTION INTRAMUSCULAR; INTRAVENOUS at 14:40

## 2021-04-15 NOTE — ANESTHESIA POSTPROCEDURE EVALUATION
Post-Anesthesia Evaluation & Assessment    Visit Vitals  BP (!) 147/67   Pulse 77   Temp 36.4 °C (97.6 °F)   Resp 13   Ht 5' (1.524 m)   Wt 79.1 kg (174 lb 7 oz)   SpO2 98%   BMI 34.07 kg/m²       Nausea/Vomiting: no nausea and no vomiting    Post-operative hydration adequate. Pain score (VAS): 3    Mental status & Level of consciousness: alert and oriented x 3    Neurological status: moves all extremities, sensation grossly intact    Pulmonary status: airway patent, no supplemental oxygen required    Complications related to anesthesia: none    Patient has met all discharge requirements. Additional comments:  Procedure(s):  ROBOTIC ASSISTED LAPAROSCOPIC RESECTION OF PELVIC MASS,BILATERAL SALPINGO OOPHORECTOMY, WITH STAGING, \"SPEC POP\". general    <BSHSIANPOST>    INITIAL Post-op Vital signs:   Vitals Value Taken Time   /67 04/15/21 1510   Temp 36.4 °C (97.6 °F) 04/15/21 1400   Pulse 79 04/15/21 1515   Resp 12 04/15/21 1515   SpO2 97 % 04/15/21 1515   Vitals shown include unvalidated device data.

## 2021-04-15 NOTE — PERIOP NOTES
Paged Dr. Lisa Marinelli for patient sign out. Patient meets criteria for transfer to the next phase of care.

## 2021-04-15 NOTE — PERIOP NOTES
Reviewed PTA medication list with patient/caregiver and patient/caregiver denies any additional medications. Patient admits to having a responsible adult care for them at home for at least 24 hours after surgery. Patient encouraged to use gown warming system and informed that using said warming gown to regulate body temperature prior to a procedure has been shown to help reduce the risks of blood clots and infection. Patient's pharmacy of choice verified and documented in PTA medication section. Dual skin assessment & fall risk band verification completed with Moises Pearl RN.

## 2021-04-15 NOTE — PERIOP NOTES
Patient is still vomiting after administration of ZOFRAN 4 mg. Pain rated at 6/10, constant cramping. Called and updated Dr. Alyssa Prakash. Received orders for PHENERGAN.

## 2021-04-15 NOTE — ANESTHESIA PREPROCEDURE EVALUATION
Relevant Problems   CARDIOVASCULAR   (+) HTN (hypertension)      PERSONAL HX & FAMILY HX OF CANCER   (+) Malignant neoplasm (HCC)       Anesthetic History     PONV          Review of Systems / Medical History  Patient summary reviewed, nursing notes reviewed and pertinent labs reviewed    Pulmonary  Within defined limits            Pertinent negatives: No sleep apnea and smoker     Neuro/Psych   Within defined limits           Cardiovascular    Hypertension          Hyperlipidemia         GI/Hepatic/Renal             Pertinent negatives: No GERD   Endo/Other      Hypothyroidism  Cancer     Other Findings              Physical Exam    Airway  Mallampati: II  TM Distance: 4 - 6 cm  Neck ROM: normal range of motion        Cardiovascular    Rhythm: regular  Rate: normal         Dental  No notable dental hx       Pulmonary  Breath sounds clear to auscultation               Abdominal  GI exam deferred       Other Findings            Anesthetic Plan    ASA: 2  Anesthesia type: general          Induction: Intravenous  Anesthetic plan and risks discussed with: Patient

## 2021-04-15 NOTE — DISCHARGE INSTRUCTIONS
Patient Education        Laparoscopy: What to Expect at Home  Your Recovery  After laparoscopic surgery, you are likely to have pain for the next several days. You may have a low fever and feel tired and sick to your stomach. This is common. You should feel better after 1 to 2 weeks. This care sheet gives you a general idea about how long it will take for you to recover. But each person recovers at a different pace. Follow the steps below to get better as quickly as possible. How can you care for yourself at home? Activity    · Rest when you feel tired. Getting enough sleep will help you recover.     · Try to walk each day. Start by walking a little more than you did the day before. Bit by bit, increase the amount you walk. Walking boosts blood flow and helps prevent pneumonia and constipation.     · Avoid strenuous activities, such as bicycle riding, jogging, weight lifting, or aerobic exercise, until your doctor says it is okay.     · Avoid lifting anything that would make you strain. This may include a child, heavy grocery bags and milk containers, a heavy briefcase or backpack, cat litter or dog food bags, or a vacuum .     · You may also have pain in your shoulder. The pain usually lasts about 1 or 2 days.     · You may drive when you are no longer taking pain medicine and can quickly move your foot from the gas pedal to the brake. You must also be able to sit comfortably for a long period of time, even if you do not plan to go far. You might get caught in traffic.     · You will probably need to take 2 weeks off from work. It depends on the type of work you do and how you feel.     · You may shower 24 to 48 hours after surgery, if your doctor okays it. Pat the cut (incision) dry. Do not take a bath for the first 2 weeks, or until your doctor tells you it is okay.    Diet    · If your stomach is upset, try bland, low-fat foods such as plain rice, broiled chicken, toast, and yogurt.     · Drink plenty of fluids to prevent dehydration. Choose water and other caffeine-free clear liquids. If you have kidney, heart, or liver disease and have to limit fluids, talk with your doctor before you increase the amount of fluids you drink.     · You may notice that your bowel movements are not regular right after your surgery. This is common. Avoid constipation and straining with bowel movements. You may want to take a fiber supplement every day. If you have not had a bowel movement after a couple of days, ask your doctor about taking a mild laxative. Medicines    · Your doctor will tell you if and when you can restart your medicines. You will also get instructions about taking any new medicines.     · If you take aspirin or some other blood thinner, ask your doctor if and when to start taking it again. Make sure that you understand exactly what your doctor wants you to do.     · Take pain medicines exactly as directed. ? If the doctor gave you a prescription medicine for pain, take it as prescribed. ? If you are not taking a prescription pain medicine, ask your doctor if you can take an over-the-counter medicine.     · If your doctor prescribed antibiotics, take them as directed. Do not stop taking them just because you feel better. You need to take the full course of antibiotics.     · If you think your pain medicine is making you sick to your stomach:  ? Take your medicine after meals (unless your doctor has told you not to). ? Ask your doctor for a different pain medicine. Incision care    · If you have strips of tape on the incision, leave the tape on for a week or until it falls off.     · Wash the area daily with warm, soapy water and pat it dry. Don't use hydrogen peroxide or alcohol, which can slow healing. You may cover the area with a gauze bandage if it weeps or rubs against clothing. Change the bandage every day. Follow-up care is a key part of your treatment and safety.  Be sure to make and go to all appointments, and call your doctor if you are having problems. It's also a good idea to know your test results and keep a list of the medicines you take. When should you call for help? Call 911 anytime you think you may need emergency care. For example, call if:    · You passed out (lost consciousness).     · You are short of breath. Call your doctor now or seek immediate medical care if:    · You have pain that does not get better after you take pain medicine.     · You have loose stitches, or your incision comes open.     · Bright red blood has soaked through your bandage.     · You have signs of infection, such as:  ? Increased pain, swelling, warmth, or redness. ? Red streaks leading from the incision. ? Pus draining from the incision. ? A fever.     · You are sick to your stomach or cannot keep fluids down.     · You have signs of a blood clot in your leg (called a deep vein thrombosis), such as:  ? Pain in your calf, back of the knee, thigh, or groin. ? Redness and swelling in your leg or groin.     · You cannot pass stools or gas. Watch closely for any changes in your health, and be sure to contact your doctor if you have any problems. Where can you learn more? Go to http://www.gray.com/  Enter G657 in the search box to learn more about \"Laparoscopy: What to Expect at Home. \"  Current as of: April 15, 2020               Content Version: 12.8  © 2006-2021 [employer]. Care instructions adapted under license by [employer] (which disclaims liability or warranty for this information). If you have questions about a medical condition or this instruction, always ask your healthcare professional. [place] [address] any warranty or liability for your use of this information.          DISCHARGE SUMMARY from Nurse    PATIENT INSTRUCTIONS:    After general anesthesia or intravenous sedation, for 24 hours or while taking prescription Narcotics:  · Limit your activities  · Do not drive and operate hazardous machinery  · Do not make important personal or business decisions  · Do  not drink alcoholic beverages  · If you have not urinated within 8 hours after discharge, please contact your surgeon on call. Report the following to your surgeon:  · Excessive pain, swelling, redness or odor of or around the surgical area  · Temperature over 100.5  · Nausea and vomiting lasting longer than 4 hours or if unable to take medications  · Any signs of decreased circulation or nerve impairment to extremity: change in color, persistent  numbness, tingling, coldness or increase pain  · Any questions    What to do at Home:  LIGHT DIET TODAY ADVANCE AS TOLERATED  OK TO Ankru 78 2 WEEKS  RETURN TO OFFICE IN 2 WEEKS CALL FOR APPT    If you experience any of the following symptoms heavy bleeding, fevers, severe pain, please follow up with dr Anitha Maldonado    *  Please give a list of your current medications to your Primary Care Provider. *  Please update this list whenever your medications are discontinued, doses are      changed, or new medications (including over-the-counter products) are added. *  Please carry medication information at all times in case of emergency situations. These are general instructions for a healthy lifestyle:    No smoking/ No tobacco products/ Avoid exposure to second hand smoke  Surgeon General's Warning:  Quitting smoking now greatly reduces serious risk to your health.     Obesity, smoking, and sedentary lifestyle greatly increases your risk for illness    A healthy diet, regular physical exercise & weight monitoring are important for maintaining a healthy lifestyle    You may be retaining fluid if you have a history of heart failure or if you experience any of the following symptoms:  Weight gain of 3 pounds or more overnight or 5 pounds in a week, increased swelling in our hands or feet or shortness of breath while lying flat in bed. Please call your doctor as soon as you notice any of these symptoms; do not wait until your next office visit. The discharge information has been reviewed with the patient and caregiver. The patient and caregiver verbalized understanding. Discharge medications reviewed with the patient and caregiver and appropriate educational materials and side effects teaching were provided.   ___________________________________________________________________________________________________________________________________    Patient armband removed and shredded

## 2021-04-15 NOTE — BRIEF OP NOTE
Brief Postoperative Note    Patient: Rod Parker  YOB: 1940  MRN: [id number]    Date of Procedure: 4/15/2021     Pre-Op Diagnosis: PELVIC MASS    Post-Op Diagnosis: Same as preoperative diagnosis. Procedure(s):  ROBOTIC ASSISTED LAPAROSCOPIC RESECTION OF PELVIC MASS,BILATERAL SALPINGO OOPHORECTOMY, WITH STAGING, \"SPEC POP\"    Surgeon(s):  Tori Wyman MD    Surgical Assistant: Surg Asst-1: Jeffery Yousif; Elias Graham    Anesthesia: General     Estimated Blood Loss (mL): less than 50     Complications: None    Specimens:   ID Type Source Tests Collected by Time Destination   1 : LEFT OVARIAN BIOPSY Frozen Section Ovary  Tori Wyman MD 4/15/2021 1220 Pathology   2 : RIGHT TUBE AND OVARY Preservative Ovary  Tori Wyman MD 4/15/2021 1241 Pathology   3 : LEFT TUBE AND OVARY Preservative Ovary  Tori Wyman MD 4/15/2021 1247 Pathology   1 : PELVIC WASHINGS Fresh Pelvis  Tori Wyman MD 4/15/2021 1208 Cytology        Implants: * No implants in log *    Drains: * No LDAs found *    Findings: 6 cm left ovarian cyst adhered to pelvic sidewall and rectum with small papillary nodule within cyst. Frozen: benign.  Atrophic appearing right ovary adhered to pelvic sidewall    Electronically Signed by Marty Tolliver MD on 4/15/2021 at 1:04 PM

## 2021-04-15 NOTE — INTERVAL H&P NOTE
Update History & Physical 
 
The Patient's History and Physical of March 30,  
 was reviewed with the patient and I examined the patient. There was no change. The surgical site was confirmed by the patient and me. Plan:  The risk, benefits, expected outcome, and alternative to the recommended procedure have been discussed with the patient. Patient understands and wants to proceed with the procedure.  
 
Electronically signed by Ash Veloz MD on 4/15/2021 at 11:27 AM

## 2021-04-15 NOTE — PERIOP NOTES
Report given to Whitney Cohen RN for phase 2 level of care.     Visit Vitals  BP (!) 147/67   Pulse 77   Temp 97.6 °F (36.4 °C)   Resp 13   Ht 5' (1.524 m)   Wt 79.1 kg (174 lb 7 oz)   SpO2 98%   BMI 34.07 kg/m²       Intake/Output Summary (Last 24 hours) at 4/15/2021 1525  Last data filed at 4/15/2021 1520  Gross per 24 hour   Intake 3000 ml   Output 50 ml   Net 2950 ml

## 2021-04-16 NOTE — OP NOTES
[address] REPORT    Name:  Nicole Denton  MR#:   [id number]  :  1940  ACCOUNT #:  [de-identified]  DATE OF SERVICE:  04/15/2021    PREOPERATIVE DIAGNOSES:  Left ovarian cyst, history of cervical cancer. POSTOPERATIVE DIAGNOSES:  Left ovarian cyst, history of cervical cancer. PROCEDURES PERFORMED:  Robotic-assisted laparoscopic resection of left ovarian cyst, right salpingo-oophorectomy, and cystoscopy. SURGEON:  Leroy Maravilla DO    ASSISTANT:  Irene Booker. ASSISTANT TASKS:  Retraction and closing. ANESTHESIA:  General.    COMPLICATIONS:  None. SPECIMENS REMOVED:  Right tube and ovary, left ovarian biopsy, and left ovarian cyst.    IMPLANTS:  None. FLUIDS:  2400 mL of crystalloid. URINE OUTPUT:  100 mL. ESTIMATED BLOOD LOSS:  25 mL. INDICATIONS:  The patient is a 72-year-old who had been having abdominal pain and had a CT scan done which revealed a mass. She was seen by Dr. Leo Toney, and an ultrasound was done. She continues to have some pressure in that left lower quadrant; it has gotten slightly worse. Her CA-125 was 23.8. She presents today for definitive surgical management. PROCEDURE:  The patient was taken to the operating room where general anesthesia was obtained without difficulty. She was placed in dorsal lithotomy position and prepped and draped in normal sterile fashion. Surgical time-out was taken by the entire surgical team.  A Fortune catheter was placed. At this point, a Veress needle was introduced in the left upper quadrant in the midclavicular line, and pneumoperitoneum was obtained to 15 mmHg. An incision in the midline approximately 5 cm below the xiphoid was made, and an 8-mm robotic trocar was advanced. Laparoscope was introduced. There was no evidence of injury from our entry. The patient was placed in Trendelenburg.   An 8-mm robotic trocar was placed in the left and right side of the abdomen under direct visualization, and a 5-mm trocar was placed in the right side of the abdomen under direct visualization. There was evidence of scarring from her prior surgery and radiation. Initially, the ovarian cyst was not identified; however, attention was turned to the left side where with medial traction on the colon, it was mobilized over the top of where we were able to identify the ovarian cyst.  In doing this dissection, the cyst was ruptured. Within the cyst, there was a papillary nodule that was removed and sent for frozen section. At this point, the cyst wall was further dissected free from its attachment to the pelvic sidewall and rectosigmoid colon. We were able to access the retroperitoneum and identify the ureter. So, a window was made above the ureter, and the left IP ligament was sealed and divided. With anterior traction, further dissection was undertaken to mobilize as much of the cyst wall that we could safely remove from the pelvic sidewall and her blood vessels. This was then placed in an EndoCatch bag, removed, and sent for permanent specimen. Frozen section from the previous biopsy was benign. Attention was then turned to the right side where the ovary was identified under some small bowel which was mobilized. It appeared very atrophic, and on initial medial traction, we opened up the peritoneum and then began to dissect into the retroperitoneal space. The ureter was identified. A window above that ureter was made, and the right IP ligament was sealed and divided. With medial traction, we dissected off as much of the ovarian as we could; however, it was quite scarred and very close to the ureter. Therefore, at this point, we removed as much of ovary as we could, was placed in an EndoCatch bag, and removed and sent for permanent specimen. Pelvis was irritated copiously. There was good hemostasis. Carlos powder was placed over both resection beds for more hemostasis.   At this point, all trocars were removed, and pneumoperitoneum was released. Cystoscopy was performed. There was no evidence of injury to the bladder, and bilateral ureteral jets were seen. Next, all skin sites were closed with 4-0 Monocryl, and Exparel was injected at the conclusion. The patient tolerated the procedure well. Sponge, needle, and instrument counts correct x2, and the patient was taken to recovery room in stable condition.       Raymond Araiza DO [id number]  D:  04/15/2021 13:11  T:  04/15/2021 20:59  JOB #:  [id number]

## 2021-05-03 ENCOUNTER — OFFICE VISIT (OUTPATIENT)
Dept: ONCOLOGY | Age: 81
End: 2021-05-03
Payer: MEDICARE

## 2021-05-03 VITALS
WEIGHT: 174 LBS | DIASTOLIC BLOOD PRESSURE: 67 MMHG | HEIGHT: 60 IN | BODY MASS INDEX: 34.16 KG/M2 | HEART RATE: 74 BPM | TEMPERATURE: 97.1 F | SYSTOLIC BLOOD PRESSURE: 132 MMHG | OXYGEN SATURATION: 98 % | RESPIRATION RATE: 16 BRPM

## 2021-05-03 DIAGNOSIS — N83.8 OVARIAN MASS, LEFT: Primary | ICD-10-CM

## 2021-05-03 PROCEDURE — 99024 POSTOP FOLLOW-UP VISIT: CPT | Performed by: OBSTETRICS & GYNECOLOGY

## 2021-05-03 NOTE — PROGRESS NOTES
Erminia Apley is a 80 y.o. female presents in office for 2 week surgical follow up. Chief Complaint   Patient presents with    Surgical Follow-up        Do you have any unusual vaginal bleeding, discharge or irritation? No  Do you have any changes in your bowel movements? No  Have you been experiencing nausea or vomiting? No  Have you been experiencing any continuous or worsening abdominal pain? No  Any urinary burning? No    Visit Vitals  /67 (BP 1 Location: Left arm, BP Patient Position: Sitting)   Pulse 74   Temp 97.1 °F (36.2 °C) (Oral)   Resp 16   Ht 5' (1.524 m)   Wt 78.9 kg (174 lb)   SpO2 98%   BMI 33.98 kg/m²         1. Have you been to the ER, urgent care clinic since your last visit? Hospitalized since your last visit? No    2. Have you seen or consulted any other health care providers outside of the [address] [place] [address] since your last visit? Include any pap smears or colon screening. No    3 most recent PHQ Screens 3/30/2021   Little interest or pleasure in doing things Not at all   Feeling down, depressed, irritable, or hopeless Not at all   Total Score PHQ 2 0       Fall Risk Assessment, last 12 mths 3/30/2021   Able to walk? Yes   Fall in past 12 months? 0   Do you feel unsteady?  0   Are you worried about falling 0       Learning Assessment 5/3/2021   PRIMARY LEARNER Patient   BARRIERS PRIMARY LEARNER NONE   CO-LEARNER CAREGIVER No   PRIMARY LANGUAGE ENGLISH   LEARNER PREFERENCE PRIMARY DEMONSTRATION   ANSWERED BY Self   RELATIONSHIP SELF

## 2021-05-03 NOTE — LETTER
5/3/2021 Patient: Brii Lott YOB: 1940 Date of Visit: 5/3/2021 Vida Modi, [address] [facility] Rylie Deleon [address] [address] Via Fax: [phone] Dear Vida Modi MD, Thank you [facility]. My notes for this consultation are attached. If you have questions, please do not hesitate to call me. I look forward to following your patient along with you. Sincerely, Natalia Salter MD

## 2021-05-03 NOTE — PROGRESS NOTES
[facility]  [address], [address], [address] [facility]  [address] [place] [id number] [phone]  Becky Frazier       Postoperative Office Note  Patient ID:  Name: Delfina Collado  MRM: [id number]  : 1940/81 y.o. Date: 5/3/2021    SUBJECTIVE:    This is a 80 y.o.  female who presents s/p Robotic-assisted laparoscopic resection of left ovarian cyst, right salpingo-oophorectomy, and cystoscopy.  on 4/15/2021  Currently she has no problems with eating, bowel movements, voiding, or their wound  Appetite is good. Eating a regular diet without difficulty. Urinating without difficulty. Bowel movements are regular. The patient is not having any pain. .    Her pathology revealed      A: LEFT UTERINE ADNEXA, BIOPSY:   HYDROSALPINX AND FIBROSIS. B: RIGHT UTERINE ADNEXA, REMOVAL:   FRAGMENTS OF BENIGN FALLOPIAN TUBE AND FIBROADIPOSE TISSUE, SUGGESTIVE OF ADHESIONS. NO DEFINITE OVARIAN TISSUE OR NEOPLASM IDENTIFIED. C: LEFT UTERINE ADNEXA, CYSTECTOMY:   FRAGMENTS OF BENIGN FALLOPIAN TUBE AND FIBROADIPOSE TISSUE, WITH EVIDENCE OF PRIOR HEMORRHAGE. NO DEFINITE OVARIAN TISSUE OR NEOPLASM IDENTIFIED. Medications:     Current Outpatient Medications on File Prior to Visit   Medication Sig Dispense Refill    triamterene-hydroCHLOROthiazide (Maxzide-25mg) 37.5-25 mg per tablet Take 1 Tab by mouth daily.  amLODIPine (NORVASC) 2.5 mg tablet Take 2.5 mg by mouth daily (after dinner).  levothyroxine (SYNTHROID) 125 mcg tablet Take 125 mcg by mouth Daily (before breakfast).  cholecalciferol (Vitamin D3) (1000 Units /25 mcg) tablet Take 1,000 Units by mouth daily.  vitamin e (E GEMS) 100 unit capsule Take  by mouth daily.       Premarin 0.625 mg tablet TAKE 1 TABLET DAILY (Patient taking differently: Take 0.625 mg by mouth daily.) 90 Tab 3    ondansetron hcl (Zofran) 4 mg tablet Take 1 Tab by mouth every six (6) hours as needed for Nausea or Vomiting. 30 Tab 0    rizatriptan (Maxalt-MLT) 5 mg rapid dissolve tablet Take 5 mg by mouth as needed. Indications: a migraine headache       No current facility-administered medications on file prior to visit. Allergies: Allergies   Allergen Reactions    Adhesive Other (comments)     Blisters under EKG monitor pads    Codeine Nausea and Vomiting    Nortriptyline Unknown (comments)       OBJECTIVE:    Vitals:   Visit Vitals  /67 (BP 1 Location: Left arm, BP Patient Position: Sitting)   Pulse 74   Temp 97.1 °F (36.2 °C) (Oral)   Resp 16   Ht 5' (1.524 m)   Wt 78.9 kg (174 lb)   SpO2 98%   BMI 33.98 kg/m²       Physical Examination:    General:  alert, cooperative, no distress   Abdomen: soft, bowel sounds active, non-tender   Incision: healing well   Pelvic: deferred   Rectal: not done   Extremity:   extremities normal, atraumatic, no cyanosis or edema     IMPRESSION/PLAN:    Janet Pratt is Doing well postoperatively. .  She has a working diagnosis of benign hydrosalpinx. The operative procedures and clinical results have been reviewed with the patient. Implications of diagnosis discussed at length. All questions answered. I will see the patient back prn. The patient is advised to call our office with any problems or concerns.     Joe Lindsey DO  Gynecologic Oncology  5/3/2021/9:49 AM

## (undated) DEVICE — BLADELESS OBTURATOR: Brand: WECK VISTA

## (undated) DEVICE — SOLUTION LACTATED RINGERS INJECTION USP

## (undated) DEVICE — TRAP MUCUS SPECIMEN 40ML -- MEDICHOICE

## (undated) DEVICE — AGENT HEMSTAT 3GM PURIFIED PLNT STARCH PWD ABSRB ARISTA AH

## (undated) DEVICE — TOTAL TRAY, DB, 100% SILI FOLEY, 16FR 10: Brand: MEDLINE

## (undated) DEVICE — DRAPE XR C ARM 41X74IN LF --

## (undated) DEVICE — STERILE POLYISOPRENE POWDER-FREE SURGICAL GLOVES: Brand: PROTEXIS

## (undated) DEVICE — TISSUE RETRIEVAL SYSTEM: Brand: INZII RETRIEVAL SYSTEM

## (undated) DEVICE — ROBOTIC PACK: Brand: MEDLINE INDUSTRIES, INC.

## (undated) DEVICE — DISPOSABLE SUCTION/IRRIGATOR TUBE SET WITH TIP: Brand: AHTO

## (undated) DEVICE — GARMENT,MEDLINE,DVT,INT,CALF,MED, GEN2: Brand: MEDLINE

## (undated) DEVICE — SYR 10ML LUER LOK 1/5ML GRAD --

## (undated) DEVICE — VISUALIZATION SYSTEM: Brand: CLEARIFY

## (undated) DEVICE — SYSTEM ES CUP DIA3.5CM PNEUMO OCCL BLLN DISP FOR CLIN POS

## (undated) DEVICE — PAD PT POS 36 IN SURGYPAD DISP

## (undated) DEVICE — SUTURE PDS II SZ 2-0 L27IN ABSRB VLT L36MM CT-1 1/2 CIR Z339H

## (undated) DEVICE — APPLICATOR SURG XL L38CM FOR ARISTA ABSRB HEMSTAT FLEXITIP

## (undated) DEVICE — PREP SKN CHLRAPRP APL 26ML STR --

## (undated) DEVICE — SUT MONOCRYL PLUS UD 4-0 --

## (undated) DEVICE — COVER MPLR TIP CRV SCIS ACC DA VINCI

## (undated) DEVICE — TRI-LUMEN FILTERED TUBE SET WITH ACTIVATED CHARCOAL FILTER: Brand: AIRSEAL

## (undated) DEVICE — SYSTEM ES CUP DIA3CM PNEUMO OCCL BLLN DISP FOR CLIN POS

## (undated) DEVICE — ARM DRAPE

## (undated) DEVICE — SOL IRRIGATION INJ NACL 0.9% 500ML BTL

## (undated) DEVICE — GOWN,SURGICAL,AURORA,SLEEVE: Brand: MEDLINE

## (undated) DEVICE — SEAL UNIV 5-8MM DISP BX/10 -- DA VINCI XI - SNGL USE

## (undated) DEVICE — STERILE POLYISOPRENE POWDER-FREE SURGICAL GLOVES WITH EMOLLIENT COATING: Brand: PROTEXIS

## (undated) DEVICE — REM POLYHESIVE ADULT PATIENT RETURN ELECTRODE: Brand: VALLEYLAB

## (undated) DEVICE — COLUMN DRAPE

## (undated) DEVICE — TOWEL,OR,DSP,ST,BLUE,STD,4/PK,20PK/CS: Brand: MEDLINE

## (undated) DEVICE — AIRSEAL 5 MM ACCESS PORT AND LOW PROFILE OBTURATOR WITH BLADELESS OPTICAL TIP, 100 MM LENGTH: Brand: AIRSEAL

## (undated) DEVICE — CYSTO/BLADDER IRRIGATION SET, REGULATING CLAMP